# Patient Record
Sex: FEMALE | Race: WHITE
[De-identification: names, ages, dates, MRNs, and addresses within clinical notes are randomized per-mention and may not be internally consistent; named-entity substitution may affect disease eponyms.]

---

## 2018-01-05 ENCOUNTER — TRANSCRIPTION ENCOUNTER (OUTPATIENT)
Age: 53
End: 2018-01-05

## 2018-02-05 ENCOUNTER — TRANSCRIPTION ENCOUNTER (OUTPATIENT)
Age: 53
End: 2018-02-05

## 2019-05-12 PROBLEM — Z00.00 ENCOUNTER FOR PREVENTIVE HEALTH EXAMINATION: Status: ACTIVE | Noted: 2019-05-12

## 2019-05-28 ENCOUNTER — APPOINTMENT (OUTPATIENT)
Dept: OTOLARYNGOLOGY | Facility: CLINIC | Age: 54
End: 2019-05-28
Payer: MEDICAID

## 2019-05-28 VITALS
HEIGHT: 68 IN | WEIGHT: 190 LBS | DIASTOLIC BLOOD PRESSURE: 80 MMHG | BODY MASS INDEX: 28.79 KG/M2 | SYSTOLIC BLOOD PRESSURE: 124 MMHG

## 2019-05-28 DIAGNOSIS — E78.5 HYPERLIPIDEMIA, UNSPECIFIED: ICD-10-CM

## 2019-05-28 DIAGNOSIS — Z78.9 OTHER SPECIFIED HEALTH STATUS: ICD-10-CM

## 2019-05-28 PROCEDURE — 99204 OFFICE O/P NEW MOD 45 MIN: CPT | Mod: 25

## 2019-05-28 PROCEDURE — 31575 DIAGNOSTIC LARYNGOSCOPY: CPT

## 2019-05-28 NOTE — HISTORY OF PRESENT ILLNESS
[de-identified] : Patient here today c/o throat discomfort. Patient recently had GI scope performed and found to have soft tissue lesion in the hypopharynx. Patient admits having dysphagia and feels like food is getting stuck. Patient admits it has been going on for about 1 year. Increasingly getting worse. No weight loss, non smoker. 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 She states that her symptoms come and go but are happening more frequently.

## 2019-05-28 NOTE — PROCEDURE
[Dysphagia] : dysphagia not clearly evaluated by indirect laryngoscopy [Topical Lidocaine] : topical lidocaine [Oxymetazoline HCl] : oxymetazoline HCl [Flexible Endoscope] : examined with the flexible endoscope [Normal] : the false vocal folds were pink and regular, the ventricular sulcus was open, the true vocal folds were glistening white, tense and of equal length, mobility, and height [de-identified] : vallecular cyst [de-identified] : right sided large vallecular cyst

## 2019-06-10 ENCOUNTER — APPOINTMENT (OUTPATIENT)
Dept: OTOLARYNGOLOGY | Facility: CLINIC | Age: 54
End: 2019-06-10

## 2019-08-20 ENCOUNTER — OUTPATIENT (OUTPATIENT)
Dept: OUTPATIENT SERVICES | Facility: HOSPITAL | Age: 54
LOS: 1 days | Discharge: HOME | End: 2019-08-20
Payer: COMMERCIAL

## 2019-08-20 VITALS
TEMPERATURE: 98 F | DIASTOLIC BLOOD PRESSURE: 77 MMHG | HEART RATE: 78 BPM | RESPIRATION RATE: 18 BRPM | SYSTOLIC BLOOD PRESSURE: 143 MMHG | OXYGEN SATURATION: 98 % | WEIGHT: 211.64 LBS | HEIGHT: 68 IN

## 2019-08-20 DIAGNOSIS — J38.7 OTHER DISEASES OF LARYNX: ICD-10-CM

## 2019-08-20 DIAGNOSIS — J39.2 OTHER DISEASES OF PHARYNX: ICD-10-CM

## 2019-08-20 DIAGNOSIS — Z01.818 ENCOUNTER FOR OTHER PREPROCEDURAL EXAMINATION: ICD-10-CM

## 2019-08-20 LAB
ALBUMIN SERPL ELPH-MCNC: 4.6 G/DL — SIGNIFICANT CHANGE UP (ref 3.5–5.2)
ALP SERPL-CCNC: 51 U/L — SIGNIFICANT CHANGE UP (ref 30–115)
ALT FLD-CCNC: 32 U/L — SIGNIFICANT CHANGE UP (ref 0–41)
ANION GAP SERPL CALC-SCNC: 13 MMOL/L — SIGNIFICANT CHANGE UP (ref 7–14)
APTT BLD: 30.8 SEC — SIGNIFICANT CHANGE UP (ref 27–39.2)
AST SERPL-CCNC: 20 U/L — SIGNIFICANT CHANGE UP (ref 0–41)
BASOPHILS # BLD AUTO: 0.05 K/UL — SIGNIFICANT CHANGE UP (ref 0–0.2)
BASOPHILS NFR BLD AUTO: 0.6 % — SIGNIFICANT CHANGE UP (ref 0–1)
BILIRUB SERPL-MCNC: 0.3 MG/DL — SIGNIFICANT CHANGE UP (ref 0.2–1.2)
BUN SERPL-MCNC: 17 MG/DL — SIGNIFICANT CHANGE UP (ref 10–20)
CALCIUM SERPL-MCNC: 9.7 MG/DL — SIGNIFICANT CHANGE UP (ref 8.5–10.1)
CHLORIDE SERPL-SCNC: 102 MMOL/L — SIGNIFICANT CHANGE UP (ref 98–110)
CO2 SERPL-SCNC: 27 MMOL/L — SIGNIFICANT CHANGE UP (ref 17–32)
CREAT SERPL-MCNC: 0.7 MG/DL — SIGNIFICANT CHANGE UP (ref 0.7–1.5)
EOSINOPHIL # BLD AUTO: 0.13 K/UL — SIGNIFICANT CHANGE UP (ref 0–0.7)
EOSINOPHIL NFR BLD AUTO: 1.7 % — SIGNIFICANT CHANGE UP (ref 0–8)
GLUCOSE SERPL-MCNC: 80 MG/DL — SIGNIFICANT CHANGE UP (ref 70–99)
HCT VFR BLD CALC: 46.1 % — SIGNIFICANT CHANGE UP (ref 37–47)
HGB BLD-MCNC: 15.1 G/DL — SIGNIFICANT CHANGE UP (ref 12–16)
IMM GRANULOCYTES NFR BLD AUTO: 0.1 % — SIGNIFICANT CHANGE UP (ref 0.1–0.3)
INR BLD: 0.93 RATIO — SIGNIFICANT CHANGE UP (ref 0.65–1.3)
LYMPHOCYTES # BLD AUTO: 3.3 K/UL — SIGNIFICANT CHANGE UP (ref 1.2–3.4)
LYMPHOCYTES # BLD AUTO: 42.4 % — SIGNIFICANT CHANGE UP (ref 20.5–51.1)
MCHC RBC-ENTMCNC: 29.8 PG — SIGNIFICANT CHANGE UP (ref 27–31)
MCHC RBC-ENTMCNC: 32.8 G/DL — SIGNIFICANT CHANGE UP (ref 32–37)
MCV RBC AUTO: 91.1 FL — SIGNIFICANT CHANGE UP (ref 81–99)
MONOCYTES # BLD AUTO: 0.49 K/UL — SIGNIFICANT CHANGE UP (ref 0.1–0.6)
MONOCYTES NFR BLD AUTO: 6.3 % — SIGNIFICANT CHANGE UP (ref 1.7–9.3)
NEUTROPHILS # BLD AUTO: 3.8 K/UL — SIGNIFICANT CHANGE UP (ref 1.4–6.5)
NEUTROPHILS NFR BLD AUTO: 48.9 % — SIGNIFICANT CHANGE UP (ref 42.2–75.2)
NRBC # BLD: 0 /100 WBCS — SIGNIFICANT CHANGE UP (ref 0–0)
PLATELET # BLD AUTO: 245 K/UL — SIGNIFICANT CHANGE UP (ref 130–400)
POTASSIUM SERPL-MCNC: 4.5 MMOL/L — SIGNIFICANT CHANGE UP (ref 3.5–5)
POTASSIUM SERPL-SCNC: 4.5 MMOL/L — SIGNIFICANT CHANGE UP (ref 3.5–5)
PROT SERPL-MCNC: 6.9 G/DL — SIGNIFICANT CHANGE UP (ref 6–8)
PROTHROM AB SERPL-ACNC: 10.7 SEC — SIGNIFICANT CHANGE UP (ref 9.95–12.87)
RBC # BLD: 5.06 M/UL — SIGNIFICANT CHANGE UP (ref 4.2–5.4)
RBC # FLD: 12.6 % — SIGNIFICANT CHANGE UP (ref 11.5–14.5)
SODIUM SERPL-SCNC: 142 MMOL/L — SIGNIFICANT CHANGE UP (ref 135–146)
WBC # BLD: 7.78 K/UL — SIGNIFICANT CHANGE UP (ref 4.8–10.8)
WBC # FLD AUTO: 7.78 K/UL — SIGNIFICANT CHANGE UP (ref 4.8–10.8)

## 2019-08-20 PROCEDURE — 93010 ELECTROCARDIOGRAM REPORT: CPT

## 2019-08-20 NOTE — H&P PST ADULT - HISTORY OF PRESENT ILLNESS
Patient c/o difficulty in swallowing and throat discomfort x 2 years. Patient denies any c/o cp, sob, palpitations fever, cough or dysuria. Ex tolerance of 1 fos walk with out Sob. No MIKA.

## 2019-08-20 NOTE — H&P PST ADULT - REASON FOR ADMISSION
Patient scheduled for microlaryngoscopy laser excision vallecular cyst under GA on 09/3/2019 by DR. Ordoñez.

## 2019-08-20 NOTE — H&P PST ADULT - NSICDXPASTMEDICALHX_GEN_ALL_CORE_FT
PAST MEDICAL HISTORY:  Asthma No attacks    GERD (gastroesophageal reflux disease)     Hypercholesteremia

## 2019-09-03 ENCOUNTER — OUTPATIENT (OUTPATIENT)
Dept: OUTPATIENT SERVICES | Facility: HOSPITAL | Age: 54
LOS: 1 days | Discharge: HOME | End: 2019-09-03
Payer: MEDICAID

## 2019-09-03 ENCOUNTER — APPOINTMENT (OUTPATIENT)
Dept: OTOLARYNGOLOGY | Facility: AMBULATORY SURGERY CENTER | Age: 54
End: 2019-09-03
Payer: MEDICAID

## 2019-09-03 ENCOUNTER — RESULT REVIEW (OUTPATIENT)
Age: 54
End: 2019-09-03

## 2019-09-03 VITALS
OXYGEN SATURATION: 95 % | RESPIRATION RATE: 20 BRPM | HEART RATE: 64 BPM | DIASTOLIC BLOOD PRESSURE: 62 MMHG | SYSTOLIC BLOOD PRESSURE: 103 MMHG

## 2019-09-03 VITALS
TEMPERATURE: 98 F | RESPIRATION RATE: 18 BRPM | DIASTOLIC BLOOD PRESSURE: 64 MMHG | HEIGHT: 68 IN | SYSTOLIC BLOOD PRESSURE: 131 MMHG | HEART RATE: 75 BPM | WEIGHT: 211.64 LBS | OXYGEN SATURATION: 100 %

## 2019-09-03 PROCEDURE — 88305 TISSUE EXAM BY PATHOLOGIST: CPT | Mod: 26

## 2019-09-03 PROCEDURE — 31541 LARYNSCOP W/TUMR EXC + SCOPE: CPT

## 2019-09-03 RX ORDER — ONDANSETRON 8 MG/1
4 TABLET, FILM COATED ORAL ONCE
Refills: 0 | Status: DISCONTINUED | OUTPATIENT
Start: 2019-09-03 | End: 2019-09-18

## 2019-09-03 RX ORDER — SODIUM CHLORIDE 9 MG/ML
500 INJECTION, SOLUTION INTRAVENOUS
Refills: 0 | Status: DISCONTINUED | OUTPATIENT
Start: 2019-09-03 | End: 2019-09-18

## 2019-09-03 RX ORDER — ATORVASTATIN CALCIUM 80 MG/1
1 TABLET, FILM COATED ORAL
Qty: 0 | Refills: 0 | DISCHARGE

## 2019-09-03 RX ORDER — SODIUM CHLORIDE 9 MG/ML
1000 INJECTION, SOLUTION INTRAVENOUS
Refills: 0 | Status: DISCONTINUED | OUTPATIENT
Start: 2019-09-03 | End: 2019-09-18

## 2019-09-03 RX ORDER — ALBUTEROL 90 UG/1
2 AEROSOL, METERED ORAL
Qty: 0 | Refills: 0 | DISCHARGE

## 2019-09-03 RX ORDER — MORPHINE SULFATE 50 MG/1
2 CAPSULE, EXTENDED RELEASE ORAL
Refills: 0 | Status: DISCONTINUED | OUTPATIENT
Start: 2019-09-03 | End: 2019-09-18

## 2019-09-03 RX ORDER — PANTOPRAZOLE SODIUM 20 MG/1
1 TABLET, DELAYED RELEASE ORAL
Qty: 0 | Refills: 0 | DISCHARGE

## 2019-09-03 RX ORDER — ASPIRIN/CALCIUM CARB/MAGNESIUM 324 MG
1 TABLET ORAL
Qty: 0 | Refills: 0 | DISCHARGE

## 2019-09-03 RX ADMIN — SODIUM CHLORIDE 100 MILLILITER(S): 9 INJECTION, SOLUTION INTRAVENOUS at 12:11

## 2019-09-03 RX ADMIN — SODIUM CHLORIDE 1000 MILLILITER(S): 9 INJECTION, SOLUTION INTRAVENOUS at 14:19

## 2019-09-03 RX ADMIN — SODIUM CHLORIDE 500 MILLILITER(S): 9 INJECTION, SOLUTION INTRAVENOUS at 12:14

## 2019-09-03 NOTE — CHART NOTE - NSCHARTNOTEFT_GEN_A_CORE
PACU ANESTHESIA ADMISSION NOTE      Procedure:   Post op diagnosis:      ____  Intubated  TV:______       Rate: ______      FiO2: ______    _x___  Patent Airway    _x___  Full return of protective reflexes    _x___  Full recovery from anesthesia / back to baseline status    Vitals:  T(C): 36.6 (09-03-19 @ 11:52), Max: 36.8 (09-03-19 @ 09:18)  HR: 58 (09-03-19 @ 12:02) (58 - 75)  BP: 92/55 (09-03-19 @ 12:02) (92/55 - 131/64)  RR: 13 (09-03-19 @ 12:02) (13 - 20)  SpO2: 100% (09-03-19 @ 12:02) (99% - 100%)    Mental Status:  _x___ Awake   _____ Alert   _____ Drowsy   _____ Sedated    Nausea/Vomiting:  _x___  NO       ______Yes,   See Post - Op Orders         Pain Scale (0-10):  __0___    Treatment: _x___ None    ____ See Post - Op/PCA Orders    Post - Operative Fluids:   __x__ Oral   ____ See Post - Op Orders    Plan: Discharge:   _x___Home       _____Floor     _____Critical Care    _____  Other:_________________    Comments:  No anesthesia issues or complications noted.  Discharge when criteria met.

## 2019-09-04 PROBLEM — J45.909 UNSPECIFIED ASTHMA, UNCOMPLICATED: Chronic | Status: ACTIVE | Noted: 2019-08-20

## 2019-09-04 PROBLEM — E78.00 PURE HYPERCHOLESTEROLEMIA, UNSPECIFIED: Chronic | Status: ACTIVE | Noted: 2019-08-20

## 2019-09-04 PROBLEM — K21.9 GASTRO-ESOPHAGEAL REFLUX DISEASE WITHOUT ESOPHAGITIS: Chronic | Status: ACTIVE | Noted: 2019-08-20

## 2019-09-05 LAB — SURGICAL PATHOLOGY STUDY: SIGNIFICANT CHANGE UP

## 2019-09-06 DIAGNOSIS — J39.2 OTHER DISEASES OF PHARYNX: ICD-10-CM

## 2019-09-06 DIAGNOSIS — J38.7 OTHER DISEASES OF LARYNX: ICD-10-CM

## 2019-09-06 DIAGNOSIS — J45.909 UNSPECIFIED ASTHMA, UNCOMPLICATED: ICD-10-CM

## 2019-09-06 DIAGNOSIS — E78.00 PURE HYPERCHOLESTEROLEMIA, UNSPECIFIED: ICD-10-CM

## 2019-09-06 DIAGNOSIS — J35.01 CHRONIC TONSILLITIS: ICD-10-CM

## 2019-09-10 ENCOUNTER — APPOINTMENT (OUTPATIENT)
Dept: OTOLARYNGOLOGY | Facility: CLINIC | Age: 54
End: 2019-09-10
Payer: MEDICAID

## 2019-09-10 PROCEDURE — 99024 POSTOP FOLLOW-UP VISIT: CPT

## 2019-09-10 PROCEDURE — 31575 DIAGNOSTIC LARYNGOSCOPY: CPT | Mod: 58

## 2019-09-10 NOTE — HISTORY OF PRESENT ILLNESS
[FreeTextEntry1] : Patient here s/p microlaryngoscopy laser excision vallecular cyst 9/3/19. Soreness. Some dysphagia. Patient c/o clogged ears. Some otalgia.

## 2019-09-10 NOTE — PROCEDURE
[Lesion] : lesion identified by mirror examination needing further evaluation [Topical Lidocaine] : topical lidocaine [Oxymetazoline HCl] : oxymetazoline HCl [Flexible Endoscope] : examined with the flexible endoscope [Normal] : posterior cricoid area had healthy pink mucosa in the interarytenoid area and the esophageal inlet [de-identified] : well healed excision site

## 2019-09-10 NOTE — REASON FOR VISIT
[Post-Operative Visit] : a post-operative visit [FreeTextEntry2] : s/p microlaryngoscopy laser excision vallecular cyst 9/3/19

## 2019-09-23 ENCOUNTER — APPOINTMENT (OUTPATIENT)
Dept: OTOLARYNGOLOGY | Facility: CLINIC | Age: 54
End: 2019-09-23
Payer: MEDICAID

## 2019-09-23 DIAGNOSIS — J39.2 OTHER DISEASES OF PHARYNX: ICD-10-CM

## 2019-09-23 PROCEDURE — 31575 DIAGNOSTIC LARYNGOSCOPY: CPT

## 2019-09-23 PROCEDURE — 99212 OFFICE O/P EST SF 10 MIN: CPT | Mod: 25

## 2019-09-23 NOTE — PROCEDURE
[Lesion] : lesion identified by mirror examination needing further evaluation [Topical Lidocaine] : topical lidocaine [Flexible Endoscope] : examined with the flexible endoscope [Oxymetazoline HCl] : oxymetazoline HCl [Normal] : posterior cricoid area had healthy pink mucosa in the interarytenoid area and the esophageal inlet [de-identified] : vallecula well healed, pink mucosa

## 2019-09-23 NOTE — HISTORY OF PRESENT ILLNESS
[FreeTextEntry1] : Patient here s/p microlaryngoscopy laser excision vallecular cyst . Pt is doing well and has normal swallowing and voice.

## 2019-09-23 NOTE — REASON FOR VISIT
[Post-Operative Visit] : a post-operative visit [FreeTextEntry2] : microlaryngoscopy laser excision vallecular cyst

## 2020-03-18 ENCOUNTER — APPOINTMENT (OUTPATIENT)
Dept: OTOLARYNGOLOGY | Facility: CLINIC | Age: 55
End: 2020-03-18

## 2020-06-15 NOTE — H&P PST ADULT - MARITAL STATUS
Subjective   Williams Herrera is a 57 y.o. male. Presents today for   Chief Complaint   Patient presents with   • Annual Exam     wellness   • Hypertension       History of Present Illness  Patient here for wellness.  Has arterial htn, ifg and had preappt labs;   Strong fam hx of dm2, father and brother.  No cp/soa;  Still vegan for most part but introduced eggs;  Wife has dm1 and RN CDE, he eats what she eats and dose well;  Activity less as torn muscle left leg and still in boot.  Doing ok overall.    Brother and Father had prostate cancer;  Review of Systems   Respiratory: Negative for shortness of breath.    Cardiovascular: Negative for chest pain.   Musculoskeletal: Positive for gait problem.       Patient Active Problem List   Diagnosis   • Erythema of face   • Essential hypertension   • Hyperlipidemia   • Plantar fasciitis   • Kidney stones   • IFG (impaired fasting glucose)   • Psoriasis       Social History     Socioeconomic History   • Marital status:      Spouse name: Not on file   • Number of children: Not on file   • Years of education: Not on file   • Highest education level: Not on file   Tobacco Use   • Smoking status: Never Smoker   • Smokeless tobacco: Never Used   Substance and Sexual Activity   • Alcohol use: Yes     Comment: occ   • Drug use: No       No Known Allergies    Current Outpatient Medications on File Prior to Visit   Medication Sig Dispense Refill   • amLODIPine (NORVASC) 5 MG tablet Take 1 tablet by mouth Daily. 90 tablet 3   • lisinopril (PRINIVIL,ZESTRIL) 10 MG tablet Take 1 tablet by mouth Daily. 90 tablet 1   • [DISCONTINUED] OTEZLA 30 MG tablet      • [DISCONTINUED] oxyCODONE-acetaminophen (PERCOCET) 5-325 MG per tablet Take 1-2 tablets by mouth Every 6 (Six) Hours As Needed (pain). 12 tablet 0   • [DISCONTINUED] Scopolamine (TRANSDERM-SCOP, 1.5 MG,) 1.5 MG/3DAYS patch Place 1 patch on the skin as directed by provider Every 72 (Seventy-Two) Hours. 4 patch 1     No current  "facility-administered medications on file prior to visit.        Objective   Vitals:    06/15/20 1443   BP: 122/84   BP Location: Left arm   Patient Position: Sitting   Cuff Size: Adult   Pulse: 74   SpO2: 97%   Weight: 98 kg (216 lb)   Height: 188 cm (74\")     Body mass index is 27.73 kg/m².    Physical Exam   Constitutional: He appears well-developed and well-nourished.   HENT:   Head: Normocephalic and atraumatic.   Neck: Neck supple. No JVD present. No thyromegaly present.   Cardiovascular: Normal rate, regular rhythm and normal heart sounds. Exam reveals no gallop and no friction rub.   No murmur heard.  Pulmonary/Chest: Effort normal and breath sounds normal. No respiratory distress. He has no wheezes. He has no rales.   Abdominal: Soft. Bowel sounds are normal. He exhibits no distension. There is no tenderness. There is no rebound and no guarding.   Musculoskeletal: He exhibits no edema.   Neurological: He is alert. Gait abnormal.   Skin: Skin is warm and dry.   Psychiatric: He has a normal mood and affect. His behavior is normal.   Nursing note and vitals reviewed.    Component      Latest Ref Rng & Units 6/12/2020   WBC      3.40 - 10.80 10*3/mm3 6.27   RBC      4.14 - 5.80 10*6/mm3 4.72   Hemoglobin      13.0 - 17.7 g/dL 15.0   Hematocrit      37.5 - 51.0 % 43.3   MCV      79.0 - 97.0 fL 91.7   MCH      26.6 - 33.0 pg 31.8   MCHC      31.5 - 35.7 g/dL 34.6   RDW      12.3 - 15.4 % 12.6   Platelets      140 - 450 10*3/mm3 271   Neutrophil Rel %      42.7 - 76.0 % 59.2   Lymphocyte Rel %      19.6 - 45.3 % 27.3   Monocyte Rel %      5.0 - 12.0 % 8.8   Eosinophil Rel %      0.3 - 6.2 % 2.2   Basophil Rel %      0.0 - 1.5 % 1.4   Neutrophils Absolute      1.70 - 7.00 10*3/mm3 3.71   Lymphocytes Absolute      0.70 - 3.10 10*3/mm3 1.71   Monocytes Absolute      0.10 - 0.90 10*3/mm3 0.55   Eosinophils Absolute      0.00 - 0.40 10*3/mm3 0.14   Basophils Absolute      0.00 - 0.20 10*3/mm3 0.09   Immature " Granulocyte Rel %      0.0 - 0.5 % 1.1 (H)   Immature Grans, Absolute      0.00 - 0.05 10*3/mm3 0.07 (H)   nRBC      0.0 - 0.2 /100 WBC 0.0   Glucose      65 - 99 mg/dL 108 (H)   BUN      6 - 20 mg/dL 14   Creatinine      0.76 - 1.27 mg/dL 0.99   eGFR Non African Am      >60 mL/min/1.73 78   eGFR African Am      >60 mL/min/1.73 95   BUN/Creatinine Ratio      7.0 - 25.0 14.1   Sodium      136 - 145 mmol/L 140   Potassium      3.5 - 5.2 mmol/L 4.8   Chloride      98 - 107 mmol/L 103   CO2      22.0 - 29.0 mmol/L 26.7   Calcium      8.6 - 10.5 mg/dL 9.9   Total Protein      6.0 - 8.5 g/dL 7.3   Albumin      3.50 - 5.20 g/dL 4.70   Globulin      gm/dL 2.6   A/G Ratio      g/dL 1.8   Total Bilirubin      0.2 - 1.2 mg/dL 0.4   Alkaline Phosphatase      39 - 117 U/L 95   AST (SGOT)      1 - 40 U/L 15   ALT (SGPT)      1 - 41 U/L 16   Total Cholesterol      0 - 200 mg/dL 195   Triglycerides      0 - 150 mg/dL 189 (H)   HDL Cholesterol      40 - 60 mg/dL 32 (L)   VLDL Cholesterol      mg/dL 37.8   LDL Cholesterol       0 - 100 mg/dL 125 (H)   Hemoglobin A1C      4.80 - 5.60 % 5.50   PSA      0.000 - 4.000 ng/mL 3.080     Assessment/Plan   Williams was seen today for annual exam and hypertension.    Diagnoses and all orders for this visit:    Wellness examination    Essential hypertension    Mixed hyperlipidemia    IFG (impaired fasting glucose)      Counseled on diet and exercise;  Physical activity limited  a1c normal, fbs borderline, work on diet   Recheck 1 year labs       -Follow up: 12 months and prn   Single

## 2020-07-22 ENCOUNTER — OUTPATIENT (OUTPATIENT)
Dept: OUTPATIENT SERVICES | Facility: HOSPITAL | Age: 55
LOS: 1 days | Discharge: HOME | End: 2020-07-22
Payer: MEDICAID

## 2020-07-22 DIAGNOSIS — R10.2 PELVIC AND PERINEAL PAIN: ICD-10-CM

## 2020-07-22 PROCEDURE — 76856 US EXAM PELVIC COMPLETE: CPT | Mod: 26

## 2020-07-22 PROCEDURE — 76830 TRANSVAGINAL US NON-OB: CPT | Mod: 26

## 2020-07-28 ENCOUNTER — OUTPATIENT (OUTPATIENT)
Dept: OUTPATIENT SERVICES | Facility: HOSPITAL | Age: 55
LOS: 1 days | Discharge: HOME | End: 2020-07-28
Payer: MEDICAID

## 2020-07-28 DIAGNOSIS — R10.84 GENERALIZED ABDOMINAL PAIN: ICD-10-CM

## 2020-07-28 PROCEDURE — 76700 US EXAM ABDOM COMPLETE: CPT | Mod: 26

## 2020-07-29 ENCOUNTER — OUTPATIENT (OUTPATIENT)
Dept: OUTPATIENT SERVICES | Facility: HOSPITAL | Age: 55
LOS: 1 days | Discharge: HOME | End: 2020-07-29
Payer: MEDICAID

## 2020-07-29 DIAGNOSIS — Z12.31 ENCOUNTER FOR SCREENING MAMMOGRAM FOR MALIGNANT NEOPLASM OF BREAST: ICD-10-CM

## 2020-07-29 PROCEDURE — 77067 SCR MAMMO BI INCL CAD: CPT | Mod: 26

## 2020-07-29 PROCEDURE — 77063 BREAST TOMOSYNTHESIS BI: CPT | Mod: 26

## 2020-08-03 DIAGNOSIS — Z87.310 PERSONAL HISTORY OF (HEALED) OSTEOPOROSIS FRACTURE: ICD-10-CM

## 2020-08-03 DIAGNOSIS — Z13.820 ENCOUNTER FOR SCREENING FOR OSTEOPOROSIS: ICD-10-CM

## 2020-08-03 DIAGNOSIS — N95.9 UNSPECIFIED MENOPAUSAL AND PERIMENOPAUSAL DISORDER: ICD-10-CM

## 2020-08-07 ENCOUNTER — EMERGENCY (EMERGENCY)
Facility: HOSPITAL | Age: 55
LOS: 0 days | Discharge: HOME | End: 2020-08-07
Attending: EMERGENCY MEDICINE | Admitting: EMERGENCY MEDICINE
Payer: MEDICAID

## 2020-08-07 VITALS
SYSTOLIC BLOOD PRESSURE: 119 MMHG | OXYGEN SATURATION: 99 % | TEMPERATURE: 97 F | DIASTOLIC BLOOD PRESSURE: 66 MMHG | HEART RATE: 79 BPM | RESPIRATION RATE: 18 BRPM

## 2020-08-07 VITALS
TEMPERATURE: 98 F | HEART RATE: 72 BPM | RESPIRATION RATE: 18 BRPM | DIASTOLIC BLOOD PRESSURE: 63 MMHG | OXYGEN SATURATION: 98 % | SYSTOLIC BLOOD PRESSURE: 118 MMHG

## 2020-08-07 DIAGNOSIS — Z79.51 LONG TERM (CURRENT) USE OF INHALED STEROIDS: ICD-10-CM

## 2020-08-07 DIAGNOSIS — E78.00 PURE HYPERCHOLESTEROLEMIA, UNSPECIFIED: ICD-10-CM

## 2020-08-07 DIAGNOSIS — R00.2 PALPITATIONS: ICD-10-CM

## 2020-08-07 DIAGNOSIS — Z87.19 PERSONAL HISTORY OF OTHER DISEASES OF THE DIGESTIVE SYSTEM: ICD-10-CM

## 2020-08-07 DIAGNOSIS — K21.9 GASTRO-ESOPHAGEAL REFLUX DISEASE WITHOUT ESOPHAGITIS: ICD-10-CM

## 2020-08-07 DIAGNOSIS — Z79.899 OTHER LONG TERM (CURRENT) DRUG THERAPY: ICD-10-CM

## 2020-08-07 DIAGNOSIS — R10.84 GENERALIZED ABDOMINAL PAIN: ICD-10-CM

## 2020-08-07 DIAGNOSIS — R10.13 EPIGASTRIC PAIN: ICD-10-CM

## 2020-08-07 DIAGNOSIS — J45.909 UNSPECIFIED ASTHMA, UNCOMPLICATED: ICD-10-CM

## 2020-08-07 LAB
ALBUMIN SERPL ELPH-MCNC: 4 G/DL — SIGNIFICANT CHANGE UP (ref 3.5–5.2)
ALP SERPL-CCNC: 53 U/L — SIGNIFICANT CHANGE UP (ref 30–115)
ALT FLD-CCNC: 24 U/L — SIGNIFICANT CHANGE UP (ref 0–41)
ANION GAP SERPL CALC-SCNC: 11 MMOL/L — SIGNIFICANT CHANGE UP (ref 7–14)
AST SERPL-CCNC: 21 U/L — SIGNIFICANT CHANGE UP (ref 0–41)
BASOPHILS # BLD AUTO: 0.04 K/UL — SIGNIFICANT CHANGE UP (ref 0–0.2)
BASOPHILS NFR BLD AUTO: 0.4 % — SIGNIFICANT CHANGE UP (ref 0–1)
BILIRUB SERPL-MCNC: <0.2 MG/DL — SIGNIFICANT CHANGE UP (ref 0.2–1.2)
BUN SERPL-MCNC: 22 MG/DL — HIGH (ref 10–20)
CALCIUM SERPL-MCNC: 9.3 MG/DL — SIGNIFICANT CHANGE UP (ref 8.5–10.1)
CHLORIDE SERPL-SCNC: 103 MMOL/L — SIGNIFICANT CHANGE UP (ref 98–110)
CO2 SERPL-SCNC: 25 MMOL/L — SIGNIFICANT CHANGE UP (ref 17–32)
CREAT SERPL-MCNC: 0.9 MG/DL — SIGNIFICANT CHANGE UP (ref 0.7–1.5)
EOSINOPHIL # BLD AUTO: 0.17 K/UL — SIGNIFICANT CHANGE UP (ref 0–0.7)
EOSINOPHIL NFR BLD AUTO: 1.9 % — SIGNIFICANT CHANGE UP (ref 0–8)
GLUCOSE SERPL-MCNC: 113 MG/DL — HIGH (ref 70–99)
HCT VFR BLD CALC: 43.1 % — SIGNIFICANT CHANGE UP (ref 37–47)
HGB BLD-MCNC: 14.3 G/DL — SIGNIFICANT CHANGE UP (ref 12–16)
IMM GRANULOCYTES NFR BLD AUTO: 0.2 % — SIGNIFICANT CHANGE UP (ref 0.1–0.3)
LIDOCAIN IGE QN: 29 U/L — SIGNIFICANT CHANGE UP (ref 7–60)
LYMPHOCYTES # BLD AUTO: 3.94 K/UL — HIGH (ref 1.2–3.4)
LYMPHOCYTES # BLD AUTO: 44.2 % — SIGNIFICANT CHANGE UP (ref 20.5–51.1)
MCHC RBC-ENTMCNC: 29.4 PG — SIGNIFICANT CHANGE UP (ref 27–31)
MCHC RBC-ENTMCNC: 33.2 G/DL — SIGNIFICANT CHANGE UP (ref 32–37)
MCV RBC AUTO: 88.5 FL — SIGNIFICANT CHANGE UP (ref 81–99)
MONOCYTES # BLD AUTO: 0.71 K/UL — HIGH (ref 0.1–0.6)
MONOCYTES NFR BLD AUTO: 8 % — SIGNIFICANT CHANGE UP (ref 1.7–9.3)
NEUTROPHILS # BLD AUTO: 4.04 K/UL — SIGNIFICANT CHANGE UP (ref 1.4–6.5)
NEUTROPHILS NFR BLD AUTO: 45.3 % — SIGNIFICANT CHANGE UP (ref 42.2–75.2)
NRBC # BLD: 0 /100 WBCS — SIGNIFICANT CHANGE UP (ref 0–0)
PLATELET # BLD AUTO: 257 K/UL — SIGNIFICANT CHANGE UP (ref 130–400)
POTASSIUM SERPL-MCNC: 3.9 MMOL/L — SIGNIFICANT CHANGE UP (ref 3.5–5)
POTASSIUM SERPL-SCNC: 3.9 MMOL/L — SIGNIFICANT CHANGE UP (ref 3.5–5)
PROT SERPL-MCNC: 6.2 G/DL — SIGNIFICANT CHANGE UP (ref 6–8)
RBC # BLD: 4.87 M/UL — SIGNIFICANT CHANGE UP (ref 4.2–5.4)
RBC # FLD: 12.7 % — SIGNIFICANT CHANGE UP (ref 11.5–14.5)
SODIUM SERPL-SCNC: 139 MMOL/L — SIGNIFICANT CHANGE UP (ref 135–146)
TROPONIN T SERPL-MCNC: <0.01 NG/ML — SIGNIFICANT CHANGE UP
WBC # BLD: 8.92 K/UL — SIGNIFICANT CHANGE UP (ref 4.8–10.8)
WBC # FLD AUTO: 8.92 K/UL — SIGNIFICANT CHANGE UP (ref 4.8–10.8)

## 2020-08-07 PROCEDURE — 99285 EMERGENCY DEPT VISIT HI MDM: CPT

## 2020-08-07 PROCEDURE — 71045 X-RAY EXAM CHEST 1 VIEW: CPT | Mod: 26

## 2020-08-07 RX ORDER — FAMOTIDINE 10 MG/ML
20 INJECTION INTRAVENOUS ONCE
Refills: 0 | Status: COMPLETED | OUTPATIENT
Start: 2020-08-07 | End: 2020-08-07

## 2020-08-07 RX ADMIN — FAMOTIDINE 100 MILLIGRAM(S): 10 INJECTION INTRAVENOUS at 03:28

## 2020-08-07 RX ADMIN — Medication 30 MILLILITER(S): at 03:28

## 2020-08-07 NOTE — ED PROVIDER NOTE - OBJECTIVE STATEMENT
53 yo female hx of hiatal hernia and GERD present c/o palpitations 53 yo female hx of hiatal hernia and GERD present c/o palpitations. reported she woke up with palpitations and feeling tingling down her whole body and burning sensation to her chest so she came to ED for evaluation. also reported off/on generalized abdominal pain for few weeks. denies any abdominal pain in ED now.   Denies fever/chill/HA/dizziness/chest pain/sob/n/v/d/ black stool/bloody stool/urinary sxs

## 2020-08-07 NOTE — ED PROVIDER NOTE - CARE PROVIDER_API CALL
FAUZIA SANTANA  Cardiovascular Disease  [address] [place] [id number]  Phone: [phone]  Fax: [phone]  Follow Up Time:

## 2020-08-07 NOTE — ED PROVIDER NOTE - NS ED ROS FT
Constitutional: no fever, chills, no recent weight loss, change in appetite or malaise  Eyes: no redness/discharge/pain/vision changes  ENT: no rhinorrhea/ear pain/sore throat  Cardiac: + palpitations. No chest pain, SOB or edema.  Respiratory: No cough or respiratory distress  GI: No nausea, vomiting, diarrhea or abdominal pain.  : No dysuria, frequency, urgency or hematuria  MS: no pain to back or extremities, no loss of ROM, no weakness  Neuro: No headache or weakness. No LOC.  Skin: No skin rash.  Endocrine: No history of thyroid disease or diabetes.

## 2020-08-07 NOTE — ED PROVIDER NOTE - CLINICAL SUMMARY MEDICAL DECISION MAKING FREE TEXT BOX
54y female nonsmoker no drug use no fhx premature CAD awoke with palpitations/chest burning/dry mouth, resolved, occasionally with epigastric pain radiating to LUQ, does have hiatal hernia followed by Ja, on exam vital signs appreciated, well appearing, head nc/at, perrla, EOMI, conj pink op clear neck supple cor rrr lungs cta abd +bs, snt/nd no c/c/e pulses equal calves nontender neuro intact, labs and studies reviewed and d/w patient, will d/c diet modification, continue PPI, f/u with GI as well as cardio. Patient counseled regarding conditions which should prompt return.

## 2020-08-07 NOTE — ED PROVIDER NOTE - ATTENDING CONTRIBUTION TO CARE
54y female nonsmoker no drug use no fhx premature CAD awoke with palpitations/chest burning/dry mouth, resolved, occasionally with epigastric pain radiating to LUQ, does have hiatal hernia followed by aJ, on exam vital signs appreciated, well appearing, head nc/at, perrla, EOMI, conj pink op clear neck supple cor rrr lungs cta abd +bs, snt/nd no c/c/e pulses equal calves nontender neuro intact, labs and studies reviewed and d/w patient, will d/c diet modification, continue PPI, f/u with GI as well as cardio. Patient counseled regarding conditions which should prompt return.

## 2020-08-07 NOTE — ED PROVIDER NOTE - PATIENT PORTAL LINK FT
You can access the FollowMyHealth Patient Portal offered by [facility] by registering at the following website: [email or web address]. By joining [employer]’s FollowMyHealth portal, you will also be able to view your health information using other applications (apps) compatible with our system.

## 2020-08-07 NOTE — ED ADULT NURSE NOTE - NSIMPLEMENTINTERV_GEN_ALL_ED
Implemented All Universal Safety Interventions:  Edson to call system. Call bell, personal items and telephone within reach. Instruct patient to call for assistance. Room bathroom lighting operational. Non-slip footwear when patient is off stretcher. Physically safe environment: no spills, clutter or unnecessary equipment. Stretcher in lowest position, wheels locked, appropriate side rails in place. Curettage Text: The wound bed was treated with curettage after the biopsy was performed.

## 2020-08-13 ENCOUNTER — APPOINTMENT (OUTPATIENT)
Dept: OTOLARYNGOLOGY | Facility: CLINIC | Age: 55
End: 2020-08-13
Payer: MEDICAID

## 2020-08-13 PROCEDURE — 31575 DIAGNOSTIC LARYNGOSCOPY: CPT

## 2020-08-13 PROCEDURE — 99213 OFFICE O/P EST LOW 20 MIN: CPT | Mod: 25

## 2020-08-13 RX ORDER — FAMOTIDINE 40 MG/1
40 TABLET, FILM COATED ORAL
Qty: 30 | Refills: 3 | Status: ACTIVE | COMMUNITY
Start: 2020-08-13 | End: 1900-01-01

## 2020-08-13 NOTE — REVIEW OF SYSTEMS
[Patient Intake Form Reviewed] : Patient intake form was reviewed [As Noted in HPI] : as noted in HPI [Negative] : Heme/Lymph [FreeTextEntry1] : all other ros negative

## 2020-08-13 NOTE — HISTORY OF PRESENT ILLNESS
[FreeTextEntry1] : pt is here for a follow up on Vallecular cyst, Pt is feeling is the symptoms of foreign body.  she has been feeling this way before the pandemic. Patient had laser excision in September of 2019. Pt feel it is hard to swallow solids. She feel it gets stuck in her throat and she has to cough.

## 2020-08-25 ENCOUNTER — OUTPATIENT (OUTPATIENT)
Dept: OUTPATIENT SERVICES | Facility: HOSPITAL | Age: 55
LOS: 1 days | Discharge: HOME | End: 2020-08-25

## 2020-09-02 ENCOUNTER — TRANSCRIPTION ENCOUNTER (OUTPATIENT)
Age: 55
End: 2020-09-02

## 2020-10-07 DIAGNOSIS — R13.12 DYSPHAGIA, OROPHARYNGEAL PHASE: ICD-10-CM

## 2020-10-20 ENCOUNTER — OUTPATIENT (OUTPATIENT)
Dept: OUTPATIENT SERVICES | Facility: HOSPITAL | Age: 55
LOS: 1 days | Discharge: HOME | End: 2020-10-20
Payer: MEDICAID

## 2020-10-20 DIAGNOSIS — R07.0 PAIN IN THROAT: ICD-10-CM

## 2020-10-20 PROCEDURE — 76536 US EXAM OF HEAD AND NECK: CPT | Mod: 26

## 2020-10-22 ENCOUNTER — EMERGENCY (EMERGENCY)
Facility: HOSPITAL | Age: 55
LOS: 0 days | Discharge: HOME | End: 2020-10-22
Attending: STUDENT IN AN ORGANIZED HEALTH CARE EDUCATION/TRAINING PROGRAM | Admitting: STUDENT IN AN ORGANIZED HEALTH CARE EDUCATION/TRAINING PROGRAM
Payer: MEDICAID

## 2020-10-22 VITALS
RESPIRATION RATE: 18 BRPM | HEART RATE: 83 BPM | DIASTOLIC BLOOD PRESSURE: 78 MMHG | SYSTOLIC BLOOD PRESSURE: 119 MMHG | TEMPERATURE: 97 F | OXYGEN SATURATION: 98 %

## 2020-10-22 VITALS — HEIGHT: 68 IN

## 2020-10-22 DIAGNOSIS — Z79.51 LONG TERM (CURRENT) USE OF INHALED STEROIDS: ICD-10-CM

## 2020-10-22 DIAGNOSIS — J45.909 UNSPECIFIED ASTHMA, UNCOMPLICATED: ICD-10-CM

## 2020-10-22 DIAGNOSIS — R07.2 PRECORDIAL PAIN: ICD-10-CM

## 2020-10-22 DIAGNOSIS — R07.9 CHEST PAIN, UNSPECIFIED: ICD-10-CM

## 2020-10-22 DIAGNOSIS — E78.00 PURE HYPERCHOLESTEROLEMIA, UNSPECIFIED: ICD-10-CM

## 2020-10-22 DIAGNOSIS — R53.83 OTHER FATIGUE: ICD-10-CM

## 2020-10-22 DIAGNOSIS — K21.9 GASTRO-ESOPHAGEAL REFLUX DISEASE WITHOUT ESOPHAGITIS: ICD-10-CM

## 2020-10-22 LAB
ALBUMIN SERPL ELPH-MCNC: 4.3 G/DL — SIGNIFICANT CHANGE UP (ref 3.5–5.2)
ALP SERPL-CCNC: 47 U/L — SIGNIFICANT CHANGE UP (ref 30–115)
ALT FLD-CCNC: 23 U/L — SIGNIFICANT CHANGE UP (ref 0–41)
ANION GAP SERPL CALC-SCNC: 13 MMOL/L — SIGNIFICANT CHANGE UP (ref 7–14)
AST SERPL-CCNC: 33 U/L — SIGNIFICANT CHANGE UP (ref 0–41)
BASOPHILS # BLD AUTO: 0.04 K/UL — SIGNIFICANT CHANGE UP (ref 0–0.2)
BASOPHILS NFR BLD AUTO: 0.4 % — SIGNIFICANT CHANGE UP (ref 0–1)
BILIRUB SERPL-MCNC: 0.5 MG/DL — SIGNIFICANT CHANGE UP (ref 0.2–1.2)
BUN SERPL-MCNC: 16 MG/DL — SIGNIFICANT CHANGE UP (ref 10–20)
CALCIUM SERPL-MCNC: 9.6 MG/DL — SIGNIFICANT CHANGE UP (ref 8.5–10.1)
CHLORIDE SERPL-SCNC: 100 MMOL/L — SIGNIFICANT CHANGE UP (ref 98–110)
CO2 SERPL-SCNC: 23 MMOL/L — SIGNIFICANT CHANGE UP (ref 17–32)
CREAT SERPL-MCNC: 0.8 MG/DL — SIGNIFICANT CHANGE UP (ref 0.7–1.5)
EOSINOPHIL # BLD AUTO: 0.14 K/UL — SIGNIFICANT CHANGE UP (ref 0–0.7)
EOSINOPHIL NFR BLD AUTO: 1.5 % — SIGNIFICANT CHANGE UP (ref 0–8)
GLUCOSE SERPL-MCNC: 88 MG/DL — SIGNIFICANT CHANGE UP (ref 70–99)
HCT VFR BLD CALC: 49.9 % — HIGH (ref 37–47)
HGB BLD-MCNC: 16.4 G/DL — HIGH (ref 12–16)
IMM GRANULOCYTES NFR BLD AUTO: 0.2 % — SIGNIFICANT CHANGE UP (ref 0.1–0.3)
LYMPHOCYTES # BLD AUTO: 1.48 K/UL — SIGNIFICANT CHANGE UP (ref 1.2–3.4)
LYMPHOCYTES # BLD AUTO: 15.6 % — LOW (ref 20.5–51.1)
MAGNESIUM SERPL-MCNC: 2.3 MG/DL — SIGNIFICANT CHANGE UP (ref 1.8–2.4)
MCHC RBC-ENTMCNC: 30 PG — SIGNIFICANT CHANGE UP (ref 27–31)
MCHC RBC-ENTMCNC: 32.9 G/DL — SIGNIFICANT CHANGE UP (ref 32–37)
MCV RBC AUTO: 91.4 FL — SIGNIFICANT CHANGE UP (ref 81–99)
MONOCYTES # BLD AUTO: 0.61 K/UL — HIGH (ref 0.1–0.6)
MONOCYTES NFR BLD AUTO: 6.4 % — SIGNIFICANT CHANGE UP (ref 1.7–9.3)
NEUTROPHILS # BLD AUTO: 7.2 K/UL — HIGH (ref 1.4–6.5)
NEUTROPHILS NFR BLD AUTO: 75.9 % — HIGH (ref 42.2–75.2)
NRBC # BLD: 0 /100 WBCS — SIGNIFICANT CHANGE UP (ref 0–0)
PLATELET # BLD AUTO: 230 K/UL — SIGNIFICANT CHANGE UP (ref 130–400)
POTASSIUM SERPL-MCNC: 5.6 MMOL/L — HIGH (ref 3.5–5)
POTASSIUM SERPL-SCNC: 5.6 MMOL/L — HIGH (ref 3.5–5)
PROT SERPL-MCNC: 7.1 G/DL — SIGNIFICANT CHANGE UP (ref 6–8)
RBC # BLD: 5.46 M/UL — HIGH (ref 4.2–5.4)
RBC # FLD: 12.4 % — SIGNIFICANT CHANGE UP (ref 11.5–14.5)
SODIUM SERPL-SCNC: 136 MMOL/L — SIGNIFICANT CHANGE UP (ref 135–146)
TROPONIN T SERPL-MCNC: <0.01 NG/ML — SIGNIFICANT CHANGE UP
WBC # BLD: 9.49 K/UL — SIGNIFICANT CHANGE UP (ref 4.8–10.8)
WBC # FLD AUTO: 9.49 K/UL — SIGNIFICANT CHANGE UP (ref 4.8–10.8)

## 2020-10-22 PROCEDURE — 93010 ELECTROCARDIOGRAM REPORT: CPT

## 2020-10-22 PROCEDURE — 71045 X-RAY EXAM CHEST 1 VIEW: CPT | Mod: 26

## 2020-10-22 PROCEDURE — 99285 EMERGENCY DEPT VISIT HI MDM: CPT

## 2020-10-22 RX ORDER — SUCRALFATE 1 G
10 TABLET ORAL
Qty: 1120 | Refills: 0
Start: 2020-10-22 | End: 2020-11-18

## 2020-10-22 RX ORDER — SODIUM CHLORIDE 9 MG/ML
1000 INJECTION, SOLUTION INTRAVENOUS ONCE
Refills: 0 | Status: COMPLETED | OUTPATIENT
Start: 2020-10-22 | End: 2020-10-22

## 2020-10-22 RX ORDER — SUCRALFATE 1 G
1 TABLET ORAL ONCE
Refills: 0 | Status: COMPLETED | OUTPATIENT
Start: 2020-10-22 | End: 2020-10-22

## 2020-10-22 RX ADMIN — Medication 1 GRAM(S): at 20:01

## 2020-10-22 RX ADMIN — SODIUM CHLORIDE 1000 MILLILITER(S): 9 INJECTION, SOLUTION INTRAVENOUS at 15:59

## 2020-10-22 NOTE — ED PROVIDER NOTE - OBJECTIVE STATEMENT
Pt is a Pt is a  56 yo f, hx gerd, hiatal hernia on pantoprazole c/o 2 months of substernal pain. pt also endorsing mild fatigue and palpitations. pt endorsed similar presentation a couple years ago which was normal- including stress test and carotid duplex. pt also reports feeling FB sensation in throat . She has been see outpt w/ PCP [facility] and cardiology for these symptoms and had an US of her neck & Blood work which did not show anything significant.  . no urinary complaints.  no abd pain.

## 2020-10-22 NOTE — ED ADULT TRIAGE NOTE - CHIEF COMPLAINT QUOTE
patient reports chest pain radiating to left neck with  shortness of breath intermittently  x years. been seen by multiple eds for same complaint.  no modifying factors

## 2020-10-22 NOTE — ED PROVIDER NOTE - ATTENDING CONTRIBUTION TO CARE
56 yo f, hx gerd, hiatal hernia on pantoprazole  pt c/o 2 months of substernal pain. pt states she feels FB sensation in throat. pt also endorsing mild fatigue and palpitations described as faster heart beat. pt endorsed similar presentation a couple years ago which was normal- including stress test and carotid duplex.  pt has been f/u w/ pcp and cards. pt w/ recent neck sono. no urinary complaints.       vss  gen- NAD, aaox3  HENT- no grossly enlarged thyroid  card-rrr  lungs-ctab, no wheezing or rhonchi  abd-sntnd, no guarding or rebound  neuro- full str/sensation, cn ii-xii grossly intact, normal coordination and gait    will get screening labs, ekg/trop, r/o lyte abnormality  possible gi/gerd sx, possible anxiety, less likely cardiac given recent negative w/u but ekg/trop to screen 56 yo f, hx gerd, hiatal hernia on pantoprazole  pt c/o 2 months of substernal pain. pt states she feels FB sensation in throat. pt also endorsing mild fatigue and palpitations described as faster heart beat. pt endorsed similar presentation a couple years ago which was normal- including stress test and carotid duplex.  pt has been f/u w/ pcp and cards. pt w/ recent neck sono. no urinary complaints.   pt endorses follow up with multiple outpt specialists including ent who scoped her (per pt findigns c/w reflux), cards and GI. pt states last scope showed gastritis and pt intermittently compliant w/ meds.       vss  gen- NAD, aaox3  HENT- no grossly enlarged thyroid  card-rrr  lungs-ctab, no wheezing or rhonchi  abd-sntnd, no guarding or rebound  neuro- full str/sensation, cn ii-xii grossly intact, normal coordination and gait    will get screening labs, ekg/trop, r/o lyte abnormality  possible gi/gerd sx, possible anxiety, less likely cardiac given recent negative w/u but ekg/trop to screen

## 2020-10-22 NOTE — ED PROVIDER NOTE - CARE PROVIDER_API CALL
Alex Mackey S  GASTROENTEROLOGY  [address] [facility], [address] [address]  Phone: [phone]  Fax: [phone]  Follow Up Time: 1-3 Days    Bisi Conner  INTERNAL MEDICINE  [facility] [place] [address]  Phone: [phone]  Fax: [phone]  Follow Up Time: Routine

## 2020-10-22 NOTE — ED ADULT NURSE NOTE - OBJECTIVE STATEMENT
pt c/o substernal CP x 2 months, pt also c/o mild fatigue, hand "shakiness" and palpitations. pt has been seen in multiple EDs for same issue.

## 2020-10-22 NOTE — ED PROVIDER NOTE - PROVIDER TOKENS
PROVIDER:[TOKEN:[[id number]],FOLLOWUP:[1-3 Days]],PROVIDER:[TOKEN:[[id number]],FOLLOWUP:[Routine]]

## 2020-10-22 NOTE — ED PROVIDER NOTE - CLINICAL SUMMARY MEDICAL DECISION MAKING FREE TEXT BOX
pt w/ multiple complaints, however, sensation of throat fb/irritation. outpt w/u showing gerd and pt intermittently compliant. pt tolerating PO in ED w/o difficulty. no e/o airway involvement. labs normal. recent neck sono reviewed and results given to patient. pt will need to continue w/ outpt w/u. no evidence of acute process requiring admission at this point. Discussed outpatient management options including adherance to PPI, follow up provider/time frame and return precautions. Patient verbalized understanding and had opportunities to ask questions.

## 2020-10-22 NOTE — ED PROVIDER NOTE - NSFOLLOWUPINSTRUCTIONS_ED_ALL_ED_FT
Please follow outpatient for the following :  Barium Swallow and an endoscopy to determine why you are having difficulty swallowing. Please see the information for a GI specialist below.     Please follow up with your PMD outpatient for an US of your thyroid and testing of your thyroid hormones.           Chest discomfort can be caused by many different conditions which may or may not be dangerous. Causes include heartburn, lung infections, heart attack, blood clot in lungs, skin infections, strain or damage to muscle, cartilage, or bones, etc. In addition to a history and physical examination, an electrocardiogram (ECG) or other lab tests may have been performed to determine the cause of your chest pain. Follow up with your primary care provider or with a cardiologist as instructed.     SEEK IMMEDIATE MEDICAL CARE IF YOU HAVE ANY OF THE FOLLOWING SYMPTOMS: worsening chest pain, coughing up blood, unexplained back/neck/jaw pain, severe abdominal pain, dizziness or lightheadedness, fainting, shortness of breath, sweaty or clammy skin, vomiting, or racing heart beat. These symptoms may represent a serious problem that is an emergency. Do not wait to see if the symptoms will go away. Get medical help right away. Call 911 and do not drive yourself to the hospital.

## 2020-10-22 NOTE — ED PROVIDER NOTE - PHYSICAL EXAMINATION
CONSTITUTIONAL: Well-developed; well-nourished; in no acute distress.   SKIN: warm, dry  HEAD: Normocephalic; atraumatic.  EYES: PERRL, EOMI, normal sclera and conjunctiva   ENT: No nasal discharge; airway clear.  NECK: Supple; non tender. no palpable masses or palpable thyroid  CARD:  Regular rate and rhythm.   RESP: NO inc WOB , lungs ctab  ABD: soft ntnd  EXT: Normal ROM.    LYMPH: No acute cervical adenopathy.  NEURO: Alert, oriented, grossly unremarkable  PSYCH: Cooperative, appropriate.

## 2020-10-22 NOTE — ED ADULT NURSE NOTE - PMH
Asthma  No attacks  GERD (gastroesophageal reflux disease)    Hiatal hernia    Hypercholesteremia

## 2020-10-22 NOTE — ED PROVIDER NOTE - PATIENT PORTAL LINK FT
You can access the FollowMyHealth Patient Portal offered by [facility] by registering at the following website: [email or web address]. By joining [employer]’s FollowMyHealth portal, you will also be able to view your health information using other applications (apps) compatible with our system.

## 2020-10-27 PROBLEM — K44.9 DIAPHRAGMATIC HERNIA WITHOUT OBSTRUCTION OR GANGRENE: Chronic | Status: ACTIVE | Noted: 2020-10-22

## 2020-11-09 ENCOUNTER — APPOINTMENT (OUTPATIENT)
Dept: OTOLARYNGOLOGY | Facility: CLINIC | Age: 55
End: 2020-11-09
Payer: MEDICAID

## 2020-11-09 DIAGNOSIS — H93.13 TINNITUS, BILATERAL: ICD-10-CM

## 2020-11-09 DIAGNOSIS — K21.9 GASTRO-ESOPHAGEAL REFLUX DISEASE W/OUT ESOPHAGITIS: ICD-10-CM

## 2020-11-09 DIAGNOSIS — J38.7 OTHER DISEASES OF LARYNX: ICD-10-CM

## 2020-11-09 DIAGNOSIS — R13.10 DYSPHAGIA, UNSPECIFIED: ICD-10-CM

## 2020-11-09 DIAGNOSIS — R07.0 PAIN IN THROAT: ICD-10-CM

## 2020-11-09 PROCEDURE — 99072 ADDL SUPL MATRL&STAF TM PHE: CPT

## 2020-11-09 PROCEDURE — 92550 TYMPANOMETRY & REFLEX THRESH: CPT

## 2020-11-09 PROCEDURE — 99214 OFFICE O/P EST MOD 30 MIN: CPT | Mod: 25

## 2020-11-09 PROCEDURE — 31575 DIAGNOSTIC LARYNGOSCOPY: CPT

## 2020-11-09 PROCEDURE — 92557 COMPREHENSIVE HEARING TEST: CPT

## 2020-11-09 RX ORDER — LEVOFLOXACIN 500 MG/1
500 TABLET, FILM COATED ORAL
Qty: 10 | Refills: 0 | Status: ACTIVE | COMMUNITY
Start: 2020-09-10

## 2020-11-09 RX ORDER — BROMPHENIRAMINE MALEATE, PSEUDOEPHEDRINE HYDROCHLORIDE, 2; 30; 10 MG/5ML; MG/5ML; MG/5ML
30-2-10 SYRUP ORAL
Qty: 90 | Refills: 0 | Status: ACTIVE | COMMUNITY
Start: 2020-09-02

## 2020-11-09 RX ORDER — FLUTICASONE PROPIONATE 50 UG/1
50 SPRAY, METERED NASAL
Qty: 16 | Refills: 0 | Status: ACTIVE | COMMUNITY
Start: 2020-09-02

## 2020-11-09 RX ORDER — METRONIDAZOLE 500 MG/1
500 TABLET ORAL
Qty: 20 | Refills: 0 | Status: ACTIVE | COMMUNITY
Start: 2020-09-10

## 2020-11-09 RX ORDER — FAMOTIDINE 20 MG/1
20 TABLET, FILM COATED ORAL
Qty: 30 | Refills: 0 | Status: ACTIVE | COMMUNITY
Start: 2020-08-26

## 2020-11-09 RX ORDER — DICYCLOMINE HYDROCHLORIDE 10 MG/1
10 CAPSULE ORAL
Qty: 90 | Refills: 0 | Status: ACTIVE | COMMUNITY
Start: 2020-08-26

## 2020-11-09 RX ORDER — DOXYCYCLINE 100 MG/1
100 CAPSULE ORAL
Qty: 20 | Refills: 0 | Status: ACTIVE | COMMUNITY
Start: 2020-09-02

## 2020-11-09 RX ORDER — MELOXICAM 15 MG/1
15 TABLET ORAL
Qty: 30 | Refills: 0 | Status: ACTIVE | COMMUNITY
Start: 2020-09-01

## 2020-11-09 RX ORDER — AMOXICILLIN AND CLAVULANATE POTASSIUM 875; 125 MG/1; MG/1
875-125 TABLET, COATED ORAL
Qty: 20 | Refills: 0 | Status: ACTIVE | COMMUNITY
Start: 2020-11-09 | End: 1900-01-01

## 2020-11-09 NOTE — HISTORY OF PRESENT ILLNESS
[FreeTextEntry1] : Patient presents today following up on vallecular cyst. Patient had speech and swallow evaluation. Patient continues to have mild dysphagia. Speech pathology saw no issues and She saw GI and was told cyst is still present and has reflux.  Symptoms improve on PPI. Patient has a h/o laser excision of cyst in 2019.\par \par Patient also c/o tinnitus and sensitive to loud noises.

## 2020-11-17 ENCOUNTER — OUTPATIENT (OUTPATIENT)
Dept: OUTPATIENT SERVICES | Facility: HOSPITAL | Age: 55
LOS: 1 days | Discharge: HOME | End: 2020-11-17
Payer: MEDICAID

## 2020-11-17 DIAGNOSIS — R07.89 OTHER CHEST PAIN: ICD-10-CM

## 2020-11-17 PROCEDURE — 71046 X-RAY EXAM CHEST 2 VIEWS: CPT | Mod: 26

## 2021-01-05 ENCOUNTER — RX RENEWAL (OUTPATIENT)
Age: 56
End: 2021-01-05

## 2021-01-05 RX ORDER — PANTOPRAZOLE 40 MG/1
40 TABLET, DELAYED RELEASE ORAL TWICE DAILY
Qty: 60 | Refills: 2 | Status: ACTIVE | COMMUNITY
Start: 2020-08-13 | End: 1900-01-01

## 2021-12-15 ENCOUNTER — OUTPATIENT (OUTPATIENT)
Dept: OUTPATIENT SERVICES | Facility: HOSPITAL | Age: 56
LOS: 1 days | Discharge: HOME | End: 2021-12-15
Payer: MEDICAID

## 2021-12-15 DIAGNOSIS — Z12.31 ENCOUNTER FOR SCREENING MAMMOGRAM FOR MALIGNANT NEOPLASM OF BREAST: ICD-10-CM

## 2021-12-15 PROCEDURE — 77063 BREAST TOMOSYNTHESIS BI: CPT | Mod: 26

## 2021-12-15 PROCEDURE — 77067 SCR MAMMO BI INCL CAD: CPT | Mod: 26

## 2023-02-02 NOTE — ED ADULT NURSE NOTE - NS ED NURSE RECORD ANOTHER HT AND WT
2/2/2023         RE: Jennifer Cervantes  [address]  [facility] [address]        Dear Colleague,    Thank you for referring your patient, Jennifer Cervantes, to the [facility]PL[facility] [place]. Please see a copy of my visit note below.    Infusion Nursing Note:  Jennifer Cervantes presents today for add-on infusion   Patient seen by provider today: No   present during visit today: Not Applicable.    Note: Patient received dilaudid x3 and 1 L LR bolus per therapy plan for 10/10 generalized pain with relief. Patient was stable upon discharge.    Intravenous Access:  Implanted Port.    Treatment Conditions:  Results reviewed, labs MET treatment parameters, ok to proceed with treatment.    Post Infusion Assessment:  Patient tolerated infusion without incident.     Discharge Plan:   Patient and/or family verbalized understanding of discharge instructions and all questions answered.      Vicenta Boone RN                          Again, thank you for allowing me to participate in the care of your patient.        Sincerely,        BMT Infusion Nurse    
  Date:February 3, 2023      Provider requested that no letter be sent. Do not send.       [facility]      
Yes

## 2023-05-12 ENCOUNTER — EMERGENCY (EMERGENCY)
Facility: HOSPITAL | Age: 58
LOS: 0 days | Discharge: HOME | End: 2023-05-12
Attending: EMERGENCY MEDICINE
Payer: MEDICAID

## 2023-05-12 VITALS
SYSTOLIC BLOOD PRESSURE: 142 MMHG | TEMPERATURE: 97 F | OXYGEN SATURATION: 100 % | RESPIRATION RATE: 20 BRPM | WEIGHT: 190.04 LBS | DIASTOLIC BLOOD PRESSURE: 70 MMHG | HEART RATE: 72 BPM

## 2023-05-12 DIAGNOSIS — R68.84 JAW PAIN: ICD-10-CM

## 2023-05-12 DIAGNOSIS — E78.5 HYPERLIPIDEMIA, UNSPECIFIED: ICD-10-CM

## 2023-05-12 DIAGNOSIS — R07.89 OTHER CHEST PAIN: ICD-10-CM

## 2023-05-12 DIAGNOSIS — J45.909 UNSPECIFIED ASTHMA, UNCOMPLICATED: ICD-10-CM

## 2023-05-12 DIAGNOSIS — Z87.19 PERSONAL HISTORY OF OTHER DISEASES OF THE DIGESTIVE SYSTEM: ICD-10-CM

## 2023-05-12 DIAGNOSIS — R42 DIZZINESS AND GIDDINESS: ICD-10-CM

## 2023-05-12 DIAGNOSIS — R20.0 ANESTHESIA OF SKIN: ICD-10-CM

## 2023-05-12 DIAGNOSIS — K21.9 GASTRO-ESOPHAGEAL REFLUX DISEASE WITHOUT ESOPHAGITIS: ICD-10-CM

## 2023-05-12 DIAGNOSIS — H53.8 OTHER VISUAL DISTURBANCES: ICD-10-CM

## 2023-05-12 DIAGNOSIS — R29.898 OTHER SYMPTOMS AND SIGNS INVOLVING THE MUSCULOSKELETAL SYSTEM: ICD-10-CM

## 2023-05-12 LAB
ALBUMIN SERPL ELPH-MCNC: 4.3 G/DL — SIGNIFICANT CHANGE UP (ref 3.5–5.2)
ALP SERPL-CCNC: 55 U/L — SIGNIFICANT CHANGE UP (ref 30–115)
ALT FLD-CCNC: 15 U/L — SIGNIFICANT CHANGE UP (ref 0–41)
ANION GAP SERPL CALC-SCNC: 10 MMOL/L — SIGNIFICANT CHANGE UP (ref 7–14)
AST SERPL-CCNC: 16 U/L — SIGNIFICANT CHANGE UP (ref 0–41)
BASOPHILS # BLD AUTO: 0.03 K/UL — SIGNIFICANT CHANGE UP (ref 0–0.2)
BASOPHILS NFR BLD AUTO: 0.4 % — SIGNIFICANT CHANGE UP (ref 0–1)
BILIRUB SERPL-MCNC: 0.4 MG/DL — SIGNIFICANT CHANGE UP (ref 0.2–1.2)
BUN SERPL-MCNC: 15 MG/DL — SIGNIFICANT CHANGE UP (ref 10–20)
CALCIUM SERPL-MCNC: 10.5 MG/DL — SIGNIFICANT CHANGE UP (ref 8.4–10.5)
CHLORIDE SERPL-SCNC: 102 MMOL/L — SIGNIFICANT CHANGE UP (ref 98–110)
CO2 SERPL-SCNC: 28 MMOL/L — SIGNIFICANT CHANGE UP (ref 17–32)
CREAT SERPL-MCNC: 0.8 MG/DL — SIGNIFICANT CHANGE UP (ref 0.7–1.5)
EGFR: 86 ML/MIN/1.73M2 — SIGNIFICANT CHANGE UP
EOSINOPHIL # BLD AUTO: 0.08 K/UL — SIGNIFICANT CHANGE UP (ref 0–0.7)
EOSINOPHIL NFR BLD AUTO: 1 % — SIGNIFICANT CHANGE UP (ref 0–8)
GLUCOSE SERPL-MCNC: 96 MG/DL — SIGNIFICANT CHANGE UP (ref 70–99)
HCT VFR BLD CALC: 47 % — SIGNIFICANT CHANGE UP (ref 37–47)
HGB BLD-MCNC: 15.8 G/DL — SIGNIFICANT CHANGE UP (ref 12–16)
IMM GRANULOCYTES NFR BLD AUTO: 0.1 % — SIGNIFICANT CHANGE UP (ref 0.1–0.3)
LYMPHOCYTES # BLD AUTO: 2.86 K/UL — SIGNIFICANT CHANGE UP (ref 1.2–3.4)
LYMPHOCYTES # BLD AUTO: 37.3 % — SIGNIFICANT CHANGE UP (ref 20.5–51.1)
MAGNESIUM SERPL-MCNC: 2.3 MG/DL — SIGNIFICANT CHANGE UP (ref 1.8–2.4)
MCHC RBC-ENTMCNC: 29.6 PG — SIGNIFICANT CHANGE UP (ref 27–31)
MCHC RBC-ENTMCNC: 33.6 G/DL — SIGNIFICANT CHANGE UP (ref 32–37)
MCV RBC AUTO: 88.2 FL — SIGNIFICANT CHANGE UP (ref 81–99)
MONOCYTES # BLD AUTO: 0.43 K/UL — SIGNIFICANT CHANGE UP (ref 0.1–0.6)
MONOCYTES NFR BLD AUTO: 5.6 % — SIGNIFICANT CHANGE UP (ref 1.7–9.3)
NEUTROPHILS # BLD AUTO: 4.26 K/UL — SIGNIFICANT CHANGE UP (ref 1.4–6.5)
NEUTROPHILS NFR BLD AUTO: 55.6 % — SIGNIFICANT CHANGE UP (ref 42.2–75.2)
NRBC # BLD: 0 /100 WBCS — SIGNIFICANT CHANGE UP (ref 0–0)
NT-PROBNP SERPL-SCNC: 61 PG/ML — SIGNIFICANT CHANGE UP (ref 0–300)
PLATELET # BLD AUTO: 251 K/UL — SIGNIFICANT CHANGE UP (ref 130–400)
PMV BLD: 9.5 FL — SIGNIFICANT CHANGE UP (ref 7.4–10.4)
POTASSIUM SERPL-MCNC: 4.3 MMOL/L — SIGNIFICANT CHANGE UP (ref 3.5–5)
POTASSIUM SERPL-SCNC: 4.3 MMOL/L — SIGNIFICANT CHANGE UP (ref 3.5–5)
PROT SERPL-MCNC: 6.9 G/DL — SIGNIFICANT CHANGE UP (ref 6–8)
RBC # BLD: 5.33 M/UL — SIGNIFICANT CHANGE UP (ref 4.2–5.4)
RBC # FLD: 12.2 % — SIGNIFICANT CHANGE UP (ref 11.5–14.5)
SODIUM SERPL-SCNC: 140 MMOL/L — SIGNIFICANT CHANGE UP (ref 135–146)
TROPONIN T SERPL-MCNC: <0.01 NG/ML — SIGNIFICANT CHANGE UP
WBC # BLD: 7.67 K/UL — SIGNIFICANT CHANGE UP (ref 4.8–10.8)
WBC # FLD AUTO: 7.67 K/UL — SIGNIFICANT CHANGE UP (ref 4.8–10.8)

## 2023-05-12 PROCEDURE — 99285 EMERGENCY DEPT VISIT HI MDM: CPT | Mod: 25

## 2023-05-12 PROCEDURE — 70450 CT HEAD/BRAIN W/O DYE: CPT | Mod: 26,MA,59

## 2023-05-12 PROCEDURE — 80053 COMPREHEN METABOLIC PANEL: CPT

## 2023-05-12 PROCEDURE — 93010 ELECTROCARDIOGRAM REPORT: CPT

## 2023-05-12 PROCEDURE — 83735 ASSAY OF MAGNESIUM: CPT

## 2023-05-12 PROCEDURE — 71045 X-RAY EXAM CHEST 1 VIEW: CPT

## 2023-05-12 PROCEDURE — 71045 X-RAY EXAM CHEST 1 VIEW: CPT | Mod: 26

## 2023-05-12 PROCEDURE — 70496 CT ANGIOGRAPHY HEAD: CPT | Mod: MA,XU

## 2023-05-12 PROCEDURE — 84484 ASSAY OF TROPONIN QUANT: CPT

## 2023-05-12 PROCEDURE — 99285 EMERGENCY DEPT VISIT HI MDM: CPT

## 2023-05-12 PROCEDURE — 85025 COMPLETE CBC W/AUTO DIFF WBC: CPT

## 2023-05-12 PROCEDURE — 83880 ASSAY OF NATRIURETIC PEPTIDE: CPT

## 2023-05-12 PROCEDURE — 70450 CT HEAD/BRAIN W/O DYE: CPT | Mod: MA

## 2023-05-12 PROCEDURE — 70498 CT ANGIOGRAPHY NECK: CPT | Mod: 26,MA

## 2023-05-12 PROCEDURE — 93005 ELECTROCARDIOGRAM TRACING: CPT

## 2023-05-12 PROCEDURE — 70496 CT ANGIOGRAPHY HEAD: CPT | Mod: 26,MA

## 2023-05-12 PROCEDURE — 70498 CT ANGIOGRAPHY NECK: CPT | Mod: MA

## 2023-05-12 NOTE — ED ADULT TRIAGE NOTE - CHIEF COMPLAINT QUOTE
BIBA as per pt "I have been feeling weak for a month, with dizziness, neck pain, and I do not feel well"

## 2023-05-12 NOTE — ED PROVIDER NOTE - NSFOLLOWUPINSTRUCTIONS_ED_ALL_ED_FT
Dizziness    WHAT YOU NEED TO KNOW:    Dizziness is a feeling of being off balance or unsteady. Common causes of dizziness are an inner ear fluid imbalance or a lack of oxygen in your blood. Dizziness may be acute (lasts 3 days or less) or chronic (lasts longer than 3 days). You may have dizzy spells that last from seconds to a few hours.     DISCHARGE INSTRUCTIONS:    Return to the emergency department if:     You have a headache and a stiff neck.      You have shaking chills and a fever.       You vomit over and over with no relief.       Your vomit or bowel movements are red or black.       You have pain in your chest, back, or abdomen.       You have numbness, especially in your face, arms, or legs.       You have trouble moving your arms or legs.       You are confused.     Contact your healthcare provider if:     You have a fever.       Your symptoms do not get better with treatment.       You have questions or concerns about your condition or care.     Manage your symptoms:     Do not drive or operate heavy machinery when you are dizzy.       Get up slowly from sitting or lying down.       Drink plenty of liquids. Liquids help prevent dehydration. Ask how much liquid to drink each day and which liquids are best for you.    Follow up with your healthcare provider as directed: Write down your questions so you remember to ask them during your visits.        © Copyright [employer] 2019 All illustrations and images included in CareNotes are the copyrighted property of [employer] or [employer].      Our Emergency Department Referral Coordinators will be reaching out to you in the next 24-48 hours from 9:00am to 5:00pm with a follow up appointment. Please expect a phone call from the hospital in that time frame. If you do not receive a call or if you have any questions or concerns, you can reach them at   [phone]    Please follow up with neurology and cardiology for further workup.

## 2023-05-12 NOTE — ED PROVIDER NOTE - CARE PLAN
1 Principal Discharge DX:	Dizziness  Secondary Diagnosis:	Chest pain  Secondary Diagnosis:	Jaw pain  Secondary Diagnosis:	Blurry vision

## 2023-05-12 NOTE — ED PROVIDER NOTE - PHYSICAL EXAMINATION
CONSTITUTIONAL: well-developed, well-nourished, in no acute distress  SKIN: warm, dry  HEAD: Normocephalic atraumatic  EYES: no conjunctival erythema eomi peerla 3mm b/l  ENT: no nasal discharge, airway clear  NECK: full ROM, non-tender  CARD: regular rate and rhythm  RESP: normal respiratory effort, no wheezes, rales or rhonchi  ABD: soft, non-distended, non-tender  EXT: moving all extremities spontaneously no leg pain or swelling  NEURO: alert and oriented x3, strength and sensation 5/5 b/l UE and LE, CN2-12 intact, finger to nose normal b/l, able to ambulate without difficulty  PSYCH: cooperative, appropriate

## 2023-05-12 NOTE — ED ADULT NURSE NOTE - NSFALLUNIVINTERV_ED_ALL_ED
Bed/Stretcher in lowest position, wheels locked, appropriate side rails in place/Call bell, personal items and telephone in reach/Instruct patient to call for assistance before getting out of bed/chair/stretcher/Non-slip footwear applied when patient is off stretcher/[place] to call system/Physically safe environment - no spills, clutter or unnecessary equipment/Purposeful proactive rounding/Room/bathroom lighting operational, light cord in reach

## 2023-05-12 NOTE — ED PROVIDER NOTE - CLINICAL SUMMARY MEDICAL DECISION MAKING FREE TEXT BOX
57 y.o. female, PMH of hiatal hernia, GERD, hyperlipidemia, asthma, presents with multiple symptoms that have been ongoing intermittently for the past 1 month.  She endorsed nonexertional chest pain.  She also has left jaw pain.  Denies shortness of breath, nausea/vomiting.  She also has intermittent bilateral upper and lower extremity numbness/weakness intermittently, and intermittent dizziness and blurry vision.  She currently does not have the symptoms.  She also has intermittent difficulty ambulating.  Denies recent illness recent travel, insect bites, fever/chills. On exam, pt in NAD, AAOx3, head NC/AT, CN II-XII intact, PEERL, EOMi, neck (-) midline tenderness, lungs CTA B/L, CV S1S2 regular, abdomen soft/NT/ND/(+)BS, ext (-) edema, motor 5/5x4, sensation intact, ambulating with steady gait. Labs/CT reviewed. Will d/c with neuro and PMD follow up.

## 2023-05-12 NOTE — ED PROVIDER NOTE - OBJECTIVE STATEMENT
57-year-old female history of hiatal hernia GERD hyperlipidemia asthma who presents with multiple symptoms.  That has been ongoing intermittently for the past 1 month.  She endorsed nonexertional chest pain.  She also has left jaw pain.  Denies shortness of breath nausea vomiting.  She also has intermittent bilateral upper and lower extremity numbness weakness intermittently and intermittent dizziness and blurry vision.  She currently does not have the symptoms.  She also has intermittent difficulty ambulating.  Denies recent illness recent travel recent insect bite fever chills.

## 2023-05-12 NOTE — ED PROVIDER NOTE - PATIENT PORTAL LINK FT
You can access the FollowMyHealth Patient Portal offered by [facility] by registering at the following website: [email or web address]. By joining [facility]’s FollowMyHealth portal, you will also be able to view your health information using other applications (apps) compatible with our system.

## 2025-04-02 NOTE — ASU DISCHARGE PLAN (ADULT/PEDIATRIC) - DO NOT DRIVE IF TAKING PAIN MEDICATION
"[facility]    ED Boarding Nurse Handoff Addendum Report:    Date/time: 4/2/2025, 0630 AM    Activity Level: assist of 1    Fall Risk: Yes:  nonskid shoes/slippers when out of bed, patient and family education, assistive device/personal items within reach, activity supervised, and room near unit station    Active Infusions: NS at 100 ml/hr    Current Meds Due: None at this time    Current care needs: Per plan of care    Respiratory status: Room air    Vital signs (within last 30 minutes):    Vitals:    04/02/25 0120 04/02/25 0131 04/02/25 0222 04/02/25 0354   BP:  114/85 (!) 132/92 125/87   BP Location:  Left arm Left arm Left arm   Pulse:  109  83   Resp:  16  16   Temp:  98.6  F (37  C)  99.4  F (37.4  C)   TempSrc:  Oral  Oral   SpO2:  100%  99%   Weight: 49.9 kg (110 lb)      Height: 1.702 m (5' 7\")          Focused assessment within last 30 minutes:    A&O x4. VSS, RA. Getting up w/ A1 and wheel chair to BR. Could do pivot to bedside commode. Denies pain. Cultures pending. Contact prec.     ED Boarding Nurse name: Renu George RN           " NULL